# Patient Record
(demographics unavailable — no encounter records)

---

## 2024-12-10 NOTE — HISTORY OF PRESENT ILLNESS
[2/10] : the patient reports pain that is 2/10 in severity [Fever] : no fever [Chills] : no chills [Nausea] : no nausea [Vomiting] : no vomiting [Diarrhea] : no diarrhea [Vaginal Bleeding] : no vaginal bleeding [Pelvic Pressure] : no pelvic pressure [Dysuria] : no dysuria [Vaginal Discharge] : no vaginal discharge [Constipation] : no constipation [Clean/Dry/Intact] : clean, dry and intact [Erythema] : not erythematous [Swelling] : not swollen [Dehiscence] : not dehisced [Healed] : healed [Discharge] : absent of discharge [Mild] : mild vaginal bleeding [Doing Well] : is doing well [Excellent Pain Control] : has excellent pain control [No Sign of Infection] : is showing no signs of infection [Sutures Removed] : sutures were removed [de-identified] : Breast & Bottle-feeding / Managing post-op pain with use of high waited leggings and binder prn [de-identified] : Postop Exam / Anemia / Breastfeeding / Contraception [de-identified] : RTO 4 weeks for postpartum exam / Continue Iron/Vitamin C BID / Continue PNV daily while breastfeeding; Contraception - Requesting Depo administration after counseling today, to be administered at postpartum visit, rx sent to pharmacy.

## 2024-12-10 NOTE — HISTORY OF PRESENT ILLNESS
[2/10] : the patient reports pain that is 2/10 in severity [Fever] : no fever [Chills] : no chills [Nausea] : no nausea [Vomiting] : no vomiting [Diarrhea] : no diarrhea [Vaginal Bleeding] : no vaginal bleeding [Pelvic Pressure] : no pelvic pressure [Dysuria] : no dysuria [Vaginal Discharge] : no vaginal discharge [Constipation] : no constipation [Clean/Dry/Intact] : clean, dry and intact [Erythema] : not erythematous [Swelling] : not swollen [Dehiscence] : not dehisced [Healed] : healed [Discharge] : absent of discharge [Mild] : mild vaginal bleeding [Doing Well] : is doing well [Excellent Pain Control] : has excellent pain control [No Sign of Infection] : is showing no signs of infection [Sutures Removed] : sutures were removed [de-identified] : Breast & Bottle-feeding / Managing post-op pain with use of high waited leggings and binder prn [de-identified] : Postop Exam / Anemia / Breastfeeding / Contraception [de-identified] : RTO 4 weeks for postpartum exam / Continue Iron/Vitamin C BID / Continue PNV daily while breastfeeding; Contraception - Requesting Depo administration after counseling today, to be administered at postpartum visit, rx sent to pharmacy.

## 2024-12-10 NOTE — HISTORY OF PRESENT ILLNESS
[2/10] : the patient reports pain that is 2/10 in severity [Fever] : no fever [Chills] : no chills [Nausea] : no nausea [Vomiting] : no vomiting [Diarrhea] : no diarrhea [Vaginal Bleeding] : no vaginal bleeding [Pelvic Pressure] : no pelvic pressure [Dysuria] : no dysuria [Vaginal Discharge] : no vaginal discharge [Constipation] : no constipation [Clean/Dry/Intact] : clean, dry and intact [Erythema] : not erythematous [Swelling] : not swollen [Dehiscence] : not dehisced [Healed] : healed [Discharge] : absent of discharge [Mild] : mild vaginal bleeding [Doing Well] : is doing well [Excellent Pain Control] : has excellent pain control [No Sign of Infection] : is showing no signs of infection [Sutures Removed] : sutures were removed [de-identified] : Breast & Bottle-feeding / Managing post-op pain with use of high waited leggings and binder prn [de-identified] : Postop Exam / Anemia / Breastfeeding / Contraception [de-identified] : RTO 4 weeks for postpartum exam / Continue Iron/Vitamin C BID / Continue PNV daily while breastfeeding; Contraception - Requesting Depo administration after counseling today, to be administered at postpartum visit, rx sent to pharmacy.

## 2025-01-17 NOTE — HISTORY OF PRESENT ILLNESS
[Postpartum Follow Up] : postpartum follow up [Last Pap Date: ___] : Last Pap Date: [unfilled] [Delivery Date: ___] : on [unfilled] [Primary C/S] : delivered by  section [Female] : Delivery History: baby girl [Wt. ___] : weighing [unfilled] [Discharge HCT: ___] : hematocrit level was [unfilled] [Discharge HGB: ___] : hemoglobin level was [unfilled] [Complications:___] : no complications [Rhogam] : Rhogam was not administered [Rubella Vaccine] : Rubella vaccine was not administered [Pertussis Vaccine] : Pertussis vaccine administered [BTL] : no tubal ligation [Breastfeeding] : not currently nursing [Clean/Dry/Intact] : clean, dry and intact [Erythema] : not erythematous [Swelling] : not swollen [Dehiscence] : not dehisced [Healed] : healed [Back to Normal] : is back to normal in size [Examination Of The Breasts] : breasts are normal [Doing Well] : is doing well [No Sign of Infection] : is showing no signs of infection [Excellent Pain Control] : has excellent pain control [None] : None [FreeTextEntry8] : Recent hx/o heavy bleeding, pt unsure if it was her period but felt it was heavier than usual; Contraception - requesting Depo / Hx/o HypoTSH - Endocrinologist scheduled next month [de-identified] : Postpartum exam / Contraception / HypoTSH / Menorrhagia [de-identified] : Hormone panel ordered / Depo administered - Rx Ca/Vit D daily sent to pharmacy; RTO 6 months for annual gyn exam